# Patient Record
Sex: MALE | Race: BLACK OR AFRICAN AMERICAN | NOT HISPANIC OR LATINO | Employment: UNEMPLOYED | URBAN - METROPOLITAN AREA
[De-identification: names, ages, dates, MRNs, and addresses within clinical notes are randomized per-mention and may not be internally consistent; named-entity substitution may affect disease eponyms.]

---

## 2023-03-08 ENCOUNTER — TELEPHONE (OUTPATIENT)
Dept: BEHAVIORAL HEALTH | Facility: CLINIC | Age: 23
End: 2023-03-08

## 2023-03-08 ENCOUNTER — HOSPITAL ENCOUNTER (EMERGENCY)
Facility: CLINIC | Age: 23
Discharge: HOME | End: 2023-03-09
Attending: PSYCHIATRY & NEUROLOGY | Admitting: PSYCHIATRY & NEUROLOGY

## 2023-03-08 DIAGNOSIS — Z59.00 HOMELESS: ICD-10-CM

## 2023-03-08 DIAGNOSIS — F19.151: ICD-10-CM

## 2023-03-08 DIAGNOSIS — F19.10 SUBSTANCE ABUSE (H): ICD-10-CM

## 2023-03-08 DIAGNOSIS — Z20.822 LAB TEST NEGATIVE FOR COVID-19 VIRUS: ICD-10-CM

## 2023-03-08 DIAGNOSIS — F19.951 SUBSTANCE-INDUCED PSYCHOTIC DISORDER WITH HALLUCINATIONS (H): ICD-10-CM

## 2023-03-08 DIAGNOSIS — F20.9 SCHIZOPHRENIA, UNSPECIFIED TYPE (H): ICD-10-CM

## 2023-03-08 LAB
ALBUMIN SERPL BCG-MCNC: 3.8 G/DL (ref 3.5–5.2)
ALP SERPL-CCNC: 68 U/L (ref 40–129)
ALT SERPL W P-5'-P-CCNC: 14 U/L (ref 10–50)
AMPHETAMINES UR QL SCN: ABNORMAL
ANION GAP SERPL CALCULATED.3IONS-SCNC: 10 MMOL/L (ref 7–15)
AST SERPL W P-5'-P-CCNC: 14 U/L (ref 10–50)
BARBITURATES UR QL SCN: ABNORMAL
BASOPHILS # BLD AUTO: 0 10E3/UL (ref 0–0.2)
BASOPHILS NFR BLD AUTO: 0 %
BENZODIAZ UR QL SCN: ABNORMAL
BILIRUB SERPL-MCNC: 0.2 MG/DL
BUN SERPL-MCNC: 9.4 MG/DL (ref 6–20)
BZE UR QL SCN: ABNORMAL
CALCIUM SERPL-MCNC: 9.3 MG/DL (ref 8.6–10)
CANNABINOIDS UR QL SCN: ABNORMAL
CHLORIDE SERPL-SCNC: 103 MMOL/L (ref 98–107)
CREAT SERPL-MCNC: 1.15 MG/DL (ref 0.67–1.17)
DEPRECATED HCO3 PLAS-SCNC: 25 MMOL/L (ref 22–29)
EOSINOPHIL # BLD AUTO: 0.2 10E3/UL (ref 0–0.7)
EOSINOPHIL NFR BLD AUTO: 2 %
ERYTHROCYTE [DISTWIDTH] IN BLOOD BY AUTOMATED COUNT: 16.8 % (ref 10–15)
GFR SERPL CREATININE-BSD FRML MDRD: >90 ML/MIN/1.73M2
GLUCOSE SERPL-MCNC: 89 MG/DL (ref 70–99)
HCT VFR BLD AUTO: 38.2 % (ref 40–53)
HGB BLD-MCNC: 13 G/DL (ref 13.3–17.7)
IMM GRANULOCYTES # BLD: 0 10E3/UL
IMM GRANULOCYTES NFR BLD: 0 %
LYMPHOCYTES # BLD AUTO: 2.3 10E3/UL (ref 0.8–5.3)
LYMPHOCYTES NFR BLD AUTO: 32 %
MCH RBC QN AUTO: 28.3 PG (ref 26.5–33)
MCHC RBC AUTO-ENTMCNC: 34 G/DL (ref 31.5–36.5)
MCV RBC AUTO: 83 FL (ref 78–100)
MONOCYTES # BLD AUTO: 0.8 10E3/UL (ref 0–1.3)
MONOCYTES NFR BLD AUTO: 12 %
NEUTROPHILS # BLD AUTO: 3.8 10E3/UL (ref 1.6–8.3)
NEUTROPHILS NFR BLD AUTO: 54 %
NRBC # BLD AUTO: 0 10E3/UL
NRBC BLD AUTO-RTO: 0 /100
OPIATES UR QL SCN: ABNORMAL
PLATELET # BLD AUTO: 153 10E3/UL (ref 150–450)
POTASSIUM SERPL-SCNC: 4.3 MMOL/L (ref 3.4–5.3)
PROT SERPL-MCNC: 6.9 G/DL (ref 6.4–8.3)
RBC # BLD AUTO: 4.6 10E6/UL (ref 4.4–5.9)
SARS-COV-2 RNA RESP QL NAA+PROBE: NEGATIVE
SODIUM SERPL-SCNC: 138 MMOL/L (ref 136–145)
WBC # BLD AUTO: 7.1 10E3/UL (ref 4–11)

## 2023-03-08 PROCEDURE — 80053 COMPREHEN METABOLIC PANEL: CPT | Performed by: PSYCHIATRY & NEUROLOGY

## 2023-03-08 PROCEDURE — C9803 HOPD COVID-19 SPEC COLLECT: HCPCS | Performed by: PSYCHIATRY & NEUROLOGY

## 2023-03-08 PROCEDURE — 80307 DRUG TEST PRSMV CHEM ANLYZR: CPT | Performed by: FAMILY MEDICINE

## 2023-03-08 PROCEDURE — 36415 COLL VENOUS BLD VENIPUNCTURE: CPT | Performed by: PSYCHIATRY & NEUROLOGY

## 2023-03-08 PROCEDURE — U0005 INFEC AGEN DETEC AMPLI PROBE: HCPCS | Performed by: PSYCHIATRY & NEUROLOGY

## 2023-03-08 PROCEDURE — 99285 EMERGENCY DEPT VISIT HI MDM: CPT | Mod: 25 | Performed by: PSYCHIATRY & NEUROLOGY

## 2023-03-08 PROCEDURE — 99285 EMERGENCY DEPT VISIT HI MDM: CPT | Performed by: PSYCHIATRY & NEUROLOGY

## 2023-03-08 PROCEDURE — 90791 PSYCH DIAGNOSTIC EVALUATION: CPT

## 2023-03-08 PROCEDURE — 250N000013 HC RX MED GY IP 250 OP 250 PS 637: Performed by: PSYCHIATRY & NEUROLOGY

## 2023-03-08 PROCEDURE — 85025 COMPLETE CBC W/AUTO DIFF WBC: CPT | Performed by: PSYCHIATRY & NEUROLOGY

## 2023-03-08 RX ORDER — HALOPERIDOL DECANOATE 100 MG/ML
100 INJECTION INTRAMUSCULAR
COMMUNITY

## 2023-03-08 RX ORDER — ERGOCALCIFEROL 1.25 MG/1
50000 CAPSULE, LIQUID FILLED ORAL
COMMUNITY

## 2023-03-08 RX ORDER — ACYCLOVIR 400 MG/1
400 TABLET ORAL EVERY 12 HOURS
COMMUNITY

## 2023-03-08 RX ORDER — OLANZAPINE 10 MG/1
10 TABLET, ORALLY DISINTEGRATING ORAL ONCE
Status: COMPLETED | OUTPATIENT
Start: 2023-03-08 | End: 2023-03-08

## 2023-03-08 RX ORDER — BUPROPION HYDROCHLORIDE 150 MG/1
150 TABLET ORAL DAILY
Status: DISCONTINUED | OUTPATIENT
Start: 2023-03-09 | End: 2023-03-09 | Stop reason: HOSPADM

## 2023-03-08 RX ORDER — DIVALPROEX SODIUM 500 MG/1
TABLET, DELAYED RELEASE ORAL
COMMUNITY

## 2023-03-08 RX ORDER — DIVALPROEX SODIUM 500 MG/1
500 TABLET, DELAYED RELEASE ORAL EVERY MORNING
Status: DISCONTINUED | OUTPATIENT
Start: 2023-03-09 | End: 2023-03-09 | Stop reason: HOSPADM

## 2023-03-08 RX ORDER — BENZTROPINE MESYLATE 1 MG/1
1 TABLET ORAL 2 TIMES DAILY
COMMUNITY

## 2023-03-08 RX ORDER — DIVALPROEX SODIUM 500 MG/1
1000 TABLET, DELAYED RELEASE ORAL AT BEDTIME
Status: DISCONTINUED | OUTPATIENT
Start: 2023-03-08 | End: 2023-03-08

## 2023-03-08 RX ORDER — HYDROXYZINE HYDROCHLORIDE 50 MG/1
50 TABLET, FILM COATED ORAL AT BEDTIME
COMMUNITY

## 2023-03-08 RX ORDER — BUPROPION HYDROCHLORIDE 150 MG/1
150 TABLET ORAL EVERY MORNING
COMMUNITY

## 2023-03-08 RX ORDER — DIVALPROEX SODIUM 500 MG/1
1000 TABLET, DELAYED RELEASE ORAL AT BEDTIME
Status: DISCONTINUED | OUTPATIENT
Start: 2023-03-08 | End: 2023-03-09 | Stop reason: HOSPADM

## 2023-03-08 RX ORDER — DIVALPROEX SODIUM 500 MG/1
500 TABLET, DELAYED RELEASE ORAL EVERY MORNING
Status: DISCONTINUED | OUTPATIENT
Start: 2023-03-09 | End: 2023-03-08

## 2023-03-08 RX ADMIN — OLANZAPINE 10 MG: 10 TABLET, ORALLY DISINTEGRATING ORAL at 15:52

## 2023-03-08 RX ADMIN — DIVALPROEX SODIUM 1000 MG: 500 TABLET, DELAYED RELEASE ORAL at 21:48

## 2023-03-08 RX ADMIN — METFORMIN HYDROCHLORIDE 500 MG: 500 TABLET, FILM COATED ORAL at 21:48

## 2023-03-08 SDOH — ECONOMIC STABILITY - HOUSING INSECURITY: HOMELESSNESS UNSPECIFIED: Z59.00

## 2023-03-08 ASSESSMENT — COLUMBIA-SUICIDE SEVERITY RATING SCALE - C-SSRS
TOTAL  NUMBER OF ABORTED OR SELF INTERRUPTED ATTEMPTS LIFETIME: YES
1. HAVE YOU WISHED YOU WERE DEAD OR WISHED YOU COULD GO TO SLEEP AND NOT WAKE UP?: YES
TOTAL  NUMBER OF ACTUAL ATTEMPTS PAST 3 MONTHS: 1
TOTAL  NUMBER OF ACTUAL ATTEMPTS LIFETIME: 1
TOTAL  NUMBER OF ABORTED OR SELF INTERRUPTED ATTEMPTS SINCE LAST CONTACT: 1
4. HAVE YOU HAD THESE THOUGHTS AND HAD SOME INTENTION OF ACTING ON THEM?: NO
REASONS FOR IDEATION LIFETIME: COMPLETELY TO END OR STOP THE PAIN (YOU COULDN'T GO ON LIVING WITH THE PAIN OR HOW YOU WERE FEELING)
5. HAVE YOU STARTED TO WORK OUT OR WORKED OUT THE DETAILS OF HOW TO KILL YOURSELF? DO YOU INTEND TO CARRY OUT THIS PLAN?: YES
4. HAVE YOU HAD THESE THOUGHTS AND HAD SOME INTENTION OF ACTING ON THEM?: NO
LETHALITY/MEDICAL DAMAGE CODE MOST RECENT ACTUAL ATTEMPT: MINOR PHYSICAL DAMAGE
6. HAVE YOU EVER DONE ANYTHING, STARTED TO DO ANYTHING, OR PREPARED TO DO ANYTHING TO END YOUR LIFE?: YES
TOTAL  NUMBER OF ABORTED OR SELF INTERRUPTED ATTEMPTS PAST 3 MONTHS: YES
ATTEMPT LIFETIME: YES
TOTAL  NUMBER OF INTERRUPTED ATTEMPTS LIFETIME: NO
1. IN THE PAST MONTH, HAVE YOU WISHED YOU WERE DEAD OR WISHED YOU COULD GO TO SLEEP AND NOT WAKE UP?: YES
3. HAVE YOU BEEN THINKING ABOUT HOW YOU MIGHT KILL YOURSELF?: YES
5. HAVE YOU STARTED TO WORK OUT OR WORKED OUT THE DETAILS OF HOW TO KILL YOURSELF? DO YOU INTEND TO CARRY OUT THIS PLAN?: YES
2. HAVE YOU ACTUALLY HAD ANY THOUGHTS OF KILLING YOURSELF?: YES
TOTAL  NUMBER OF PREPARATORY ACTS LIFETIME: 1
TOTAL  NUMBER OF PREPARATORY ACTS PAST 3 MONTHS: 1
ATTEMPT PAST THREE MONTHS: YES
TOTAL  NUMBER OF ABORTED OR SELF INTERRUPTED ATTEMPTS LIFETIME: 1
2. HAVE YOU ACTUALLY HAD ANY THOUGHTS OF KILLING YOURSELF?: YES
6. HAVE YOU EVER DONE ANYTHING, STARTED TO DO ANYTHING, OR PREPARED TO DO ANYTHING TO END YOUR LIFE?: YES
LETHALITY/MEDICAL DAMAGE CODE MOST RECENT POTENTIAL ATTEMPT: BEHAVIOR LIKELY TO RESULT IN INJURY BUT NOT LIKELY TO CAUSE DEATH
REASONS FOR IDEATION PAST MONTH: COMPLETELY TO END OR STOP THE PAIN (YOU COULDN'T GO ON LIVING WITH THE PAIN OR HOW YOU WERE FEELING)

## 2023-03-08 ASSESSMENT — ACTIVITIES OF DAILY LIVING (ADL)
ADLS_ACUITY_SCORE: 35

## 2023-03-08 NOTE — ED NOTES
Pt inform this writer that he is ready to go back to [facility] ([facility] ) - [phone]

## 2023-03-08 NOTE — ED PROVIDER NOTES
"    [facility] EMERGENCY DEPARTMENT ([facility])     March 8, 2023     History     Chief Complaint   Patient presents with     Paranoid     Patient arrived via EMS. History of Schizophrenia; not taking medications. Patient left group; was brought to a new group home and said he could not live there due to bugs. Patient reports drinking bottle of cough syrup yesterday and smoked marijuana. . Patient reports auditory hallucinations.     Suicidal     Suicidal thoughts with plan to hang himself with belt. Patient reports attempting to hang himself 1 week ago.     HPI  Steven Valentino Lenwood is a 22 year old male with a past medical history significant for substance abuse who presents to the Emergency Department for evaluation of SI and paranoia.  Patient was seen by DEC .  Per , patient states he has been using THC and feeling depressed.  Denies any other substance use.  He vaguely admits to abusing cough syrup.  States he was at a new group home for approximately 10 minutes today but left right away because there were rats, flies, spiders, and rat droppings there.  Prior to this group home he was at a different shelter group home in [place].  He states that he was on the streets at one point, then was living in an apartment building.    Per mental health , patient states that he called the police himself today.  He presents with paranoia and SI, but the patient states that he is unsure if it is a true psychosis or if he is \"just high\".  He identifies SI with a plan to hang himself with a belt in his room.  He tells the  that he did attempt this 2-3 weeks ago in his closet but did not work.  He continues to endorse suicidal intent, states he \"cannot deal with it\", states if he had a gun he would shoot himself in the head.  States he would prefer not to hang himself as he is afraid he will choke himself and not be successful and would then also have other complications.  " "With regard to SIB he states he has done in an eraser burn in the past but nothing current.  In addition, patient does report seeing cars and hearing voices.  He states that the cars will drive by and something will be said and he is responding to this.  States that the voices he hears tell him to hurt himself, shoot someone, that he will be brutalized, or that he will go to intermediate.  Denies HI.     notes that the patient appears to change his answers,, such as first stating that he has been suicidal for a year and then stating he has been suicidal for a couple of weeks.   states that he has schizophrenia and is not taking his medications, but the patient cannot tell the  the name of his group home or what his parents' contact info is.  He states that he is his own guardian.      Past Medical History  History reviewed. No pertinent past medical history.  History reviewed. No pertinent surgical history.  No current outpatient medications on file.    No Known Allergies  Family History  History reviewed. No pertinent family history.  Social History   Social History     Tobacco Use     Smoking status: Every Day     Packs/day: 0.25     Types: Cigarettes     Smokeless tobacco: Never   Substance Use Topics     Alcohol use: Not Currently     Drug use: Yes     Types: Marijuana      Past medical history, past surgical history, medications, allergies, family history, and social history were reviewed with the patient. No additional pertinent items.      A medically appropriate review of systems was performed with pertinent positives and negatives noted in the HPI, and all other systems negative.    Physical Exam   BP: 133/76  Pulse: 76  Temp: 97.7  F (36.5  C)  Resp: 18  Height: 185.4 cm (6' 1\")  Weight: 112.5 kg (248 lb)  SpO2: 97 %  Physical Exam  Vitals and nursing note reviewed.   HENT:      Head: Normocephalic.   Eyes:      Pupils: Pupils are equal, round, and reactive to light.   Pulmonary:      " Effort: Pulmonary effort is normal.   Musculoskeletal:         General: Normal range of motion.      Cervical back: Normal range of motion.   Neurological:      General: No focal deficit present.      Mental Status: He is alert.   Psychiatric:         Attention and Perception: Perception normal. He is inattentive. He does not perceive auditory or visual hallucinations.         Mood and Affect: Mood normal. Affect is inappropriate.         Speech: Speech is tangential.         Behavior: Behavior normal. Behavior is not agitated, aggressive, hyperactive or combative. Behavior is cooperative.         Thought Content: Thought content is paranoid. Thought content includes suicidal ideation. Thought content does not include homicidal ideation.         Cognition and Memory: Cognition normal.         Judgment: Judgment normal.           ED Course, Procedures, & Data      Procedures       ED Course Selections: A consult was attained from the  DEC service. The case was discussed with Jenni Howard from that service. The consulting service's recommendations were provided at 3:00 PM.  10 minutes spent discussing case, care and disposition.    5 minutes spent reviewing prior records including EMS/police reports.     Mental Health Risk Assessment      PSS-3    Date and Time Over the past 2 weeks have you felt down, depressed, or hopeless? Over the past 2 weeks have you had thoughts of killing yourself? Have you ever attempted to kill yourself? When did this last happen? User   03/08/23 1326 yes yes yes within the last month (but not today) TMW      C-SSRS ([place])    Date and Time Q1 Wished to be Dead (Past Month) Q2 Suicidal Thoughts (Past Month) Q3 Suicidal Thought Method Q4 Suicidal Intent without Specific Plan Q5 Suicide Intent with Specific Plan Q6 Suicide Behavior (Lifetime) Within the Past 3 Months? RETIRED: Level of Risk per Screen Screening Not Complete User   03/08/23 1326 yes yes yes no yes yes -- -- -- TMW               Suicide assessment completed by mental health (D.E.C., LCSW, etc.)       Results for orders placed or performed during the hospital encounter of 03/08/23   Drug abuse screen 1 urine (ED)     Status: Abnormal   Result Value Ref Range    Amphetamines Urine Screen Positive (A) Screen Negative    Barbituates Urine Screen Negative Screen Negative    Benzodiazepine Urine Screen Negative Screen Negative    Cannabinoids Urine Screen Negative Screen Negative    Cocaine Urine Screen Negative Screen Negative    Opiates Urine Screen Negative Screen Negative   Asymptomatic COVID-19 Virus (Coronavirus) by PCR Nasopharyngeal     Status: Normal    Specimen: Nasopharyngeal; Swab   Result Value Ref Range    SARS CoV2 PCR Negative Negative    Narrative    Testing was performed using the Xpert Xpress SARS-CoV-2 Assay on the Cepheid Gene-Xpert Instrument Systems. Additional information about this Emergency Use Authorization (EUA) assay can be found via the Lab Guide. This test should be ordered for the detection of SARS-CoV-2 in individuals who meet SARS-CoV-2 clinical and/or epidemiological criteria as well as from individuals without symptoms or other reasons to suspect COVID-19. Test performance for asymptomatic patients has only been established in anterior nasal swab specimens. This test is for in vitro diagnostic use under the FDA EUA for laboratories certified under CLIA to perform high complexity testing. This test has not been FDA cleared or approved. A negative result does not rule out the presence of PCR inhibitors in the specimen or target RNA concentration below the limit of detection for the assay. The possibility of a false negative should be considered if the patient's recent exposure or clinical presentation suggests COVID-19. This test was validated by the [facility]. This laboratory is certified under the Clinical Laboratory Improvement Amendments (CLIA) as qualified to  perform high complexity laboratory testing.     Urine Drugs of Abuse Screen     Status: Abnormal    Narrative    The following orders were created for panel order Urine Drugs of Abuse Screen.  Procedure                               Abnormality         Status                     ---------                               -----------         ------                     Drug abuse screen 1 urin...[[id number]]  Abnormal            Final result                 Please view results for these tests on the individual orders.     Medications   OLANZapine zydis (zyPREXA) ODT tab 10 mg (has no administration in time range)     Labs Ordered and Resulted from Time of ED Arrival to Time of ED Departure   DRUG ABUSE SCREEN 1 URINE (ED) - Abnormal       Result Value    Amphetamines Urine Screen Positive (*)     Barbituates Urine Screen Negative      Benzodiazepine Urine Screen Negative      Cannabinoids Urine Screen Negative      Cocaine Urine Screen Negative      Opiates Urine Screen Negative     COVID-19 VIRUS (CORONAVIRUS) BY PCR - Normal    SARS CoV2 PCR Negative       No orders to display          Critical care was not performed.     Medical Decision Making  The patient's presentation was of moderate complexity (an undiagnosed new problem with uncertain diagnosis).    The patient's evaluation involved:  an assessment requiring an independent historian (see separate area of note for details)  review of external note(s) from 2 sources (see separate area of note for details)    The patient's management necessitated moderate risk (limitations due to social determinants of health (see separate area of note for details)), high risk (drug therapy requiring intensive monitoring (see separate area of note for details)) and high risk (a decision regarding hospitalization).      Assessment & Plan    Patient is here brought in by EMS, citing that he could not stay at a new facility which appears to be consistent with a sober house than a group  "home. [place] that he was sent to a sober house in the [facility] which he found unsatisfactory, citing presence of bugs, pests and rat droppings. This place was in [facility]. Patient was vague about the place and where he has been prior to being sent there. He is interested in going back to treatment, then going to another sober house. He feels he needs to be admitted however as he threatens suicide. He currently appears impaired and under the influence. His drug screen in positive for amphetamines (and negative for THC despite abusing it.)    Patient is likely under the influence of a stimulant with would be consistent with his paranoia and hallucinations. He is threatening suicide and will be referred for admission, but likely will be observed for the duration in the ED as there is inpatient bed capacity. He is given a dose of Zyprexa to manage his anxiety and paranoia while he is staying in the ED.    I have reviewed the nursing notes. I have reviewed the findings, diagnosis, plan and need for follow up with the patient.    New Prescriptions    No medications on file       Final diagnoses:   Substance abuse (H)   Substance-induced psychotic disorder with hallucinations (H)   Homeless     I, Yuly Martínez, am serving as a trained medical scribe to document services personally performed by Doug Salvador MD, based on the provider's statements to me.   Doug MORALES MD, was physically present and have reviewed and verified the accuracy of this note documented by Yuly Martínez.    Doug Salvador MD  [facility] EMERGENCY DEPARTMENT  3/8/2023     Doug Salvador MD  03/08/23 1553    "

## 2023-03-08 NOTE — ED TRIAGE NOTES
Patient arrived via EMS. History of Schizophrenia; not taking medications. Patient left group; was brought to a new group home and said he could not live there due to bugs. Patient reports drinking bottle of cough syrup yesterday and smoked marijuana. . Patient reports auditory hallucinations.     Triage Assessment     Row Name 03/08/23 1329       Triage Assessment (Adult)    Airway WDL WDL       Respiratory WDL    Respiratory WDL WDL       Skin Circulation/Temperature WDL    Skin Circulation/Temperature WDL WDL       Cardiac WDL    Cardiac WDL WDL       Peripheral/Neurovascular WDL    Peripheral Neurovascular WDL WDL       Cognitive/Neuro/Behavioral WDL    Cognitive/Neuro/Behavioral WDL X    Level of Consciousness alert    Arousal Level opens eyes spontaneously    Orientation oriented x 4    Speech clear    Mood/Behavior cooperative       Robbie Coma Scale    Best Eye Response 4-->(E4) spontaneous    Best Motor Response 6-->(M6) obeys commands    Best Verbal Response 5-->(V5) oriented    Robbie Coma Scale Score 15              
negative...

## 2023-03-08 NOTE — TELEPHONE ENCOUNTER
S: [facility] VALENCIA Angela  Jenni calling at 2:20 pm  about a 22 year old/Male presenting with S.I..        B: Pt arrived via EMS. Presenting problem, stressors: Pt reports he is very depressed and suicidal with a plan to hang himself.  He states he attempted to do so about 3 weeks ago.  He states he would shoot himself if he had a gun.  Pt's affect is incongruent with his statements and appears to be a poor historian.    Pt affect in ED: incongruent.  Pt Dx: depressive d/o and sub. Use.  Previous [facility] hx? No, pt denies    Pt endorses SI with a plan to hang self   Hx of suicide attempt? Yes:   Pt remote hx of sib.  Pt denies HI , but states voices tell him he should shoot someone.  Pt endorses auditory hallucinations  and visual hallucinations..     Hx of aggression/violence, sexual offences, legal concerns, or Epic care plan?   No  Current concerns for aggression this visit?  Not at this time  Does pt have a history of Civil Commitment? No, pt denies  Is Pt their own guardian? Yes    Pt is not prescribed medication. Is patient medication compliant? N/A  Pt denies OP services   CD concerns: Actively using/consuming Pt reports pot but is positive for amphetamines.  Acute or chronic medical concerns: None known  Does Pt present with specific needs, assistive devices, or exclusionary criteria? None      Pt is ambulatory  Pt is able to perform ADLs independently      A: Pt to be reviewed for [facility] admission. Pt is Voluntary  Preferred placement: [facility]    COVID:Negative  Utox: Positive for amphetamines   CMP: not ordered  CBC: not ordered    HCG: N/A    R: Patient cleared and ready for behavioral bed placement: Yes  Pt placed on [facility] worklist? Yes

## 2023-03-08 NOTE — PLAN OF CARE
Steven Valentino Lenwood  March 8, 2023  Plan of Care Hand-off Note     Patient Care Path: Inpatient Mental Health    Plan for Care:     Patient is currently high risk.  He is reporting suicidal ideation with plan to hang himself.  He states if he had a gun he would shoot himself.  Patient reports an attempt by hanging and gives different accounts of when it occurred.  Responses included 1,2,3 weeks ago.  Patient appears under the influence of substances.  Patient reports seeing cars and hearing voices.  He states he thinks something and the voices respond back.  He reports they tell him to hurt himself, shoot someone, or threaten that he will be brutalized or go to alf.   Plan for inpatient admission.  Continue to monitor patient needs and potential disposition change as patient clears.  There may also be concern for secondary gain, related to housing.      Critical Safety Issues: suicidal ideation with plan and intent.  Substance abuse/intoxication    Overview:  This patient is a child/adolescent: No    This patient has additional special visitor precautions: No    Legal Status: Voluntary    Contacts:   None identified    Psychiatry Consult:  Psychiatry Consult not requested because of plan for inpatient.    Updated Attending Provider, Dr. Salvador, regarding plan of care.    Jenni Howard, [facility]

## 2023-03-08 NOTE — CONSULTS
"Diagnostic Evaluation Consultation  Crisis Assessment    Patient was assessed: In Person  Patient location: [facility]  Was a release of information signed: No. Reason: specific information available regarding group home not available.      Referral Data and Chief Complaint  Steven is a 22 year old, who uses he/him pronouns, and presents to the ED via EMS. Patient is referred to the ED by self. Patient is presenting to the ED for the following concerns: paranoia, suicidal ideation.      Informed Consent and Assessment Methods     Patient identifies being his own guardian.  Writer met with patient and explained the crisis assessment process, including applicable information disclosures and limits to confidentiality, assessed understanding of the process, and obtained consent to proceed with the assessment. Patient was observed to be able to participate in the assessment as evidenced by verbal agreement and engagement in session.. Assessment methods included conducting a formal interview with patient, review of medical records, collaboration with medical staff, and obtaining relevant collateral information from family and community providers when available..     Over the course of this crisis assessment provided reassurance, offered validation and engaged patient in problem solving and disposition planning. Patient's response to interventions was receptive.     Summary of Patient Situation     Patient reports he is here for marijuana and feeling really depressed.  He states he called the police himself.  He states he was at a new group home for about 10 minutes.  \"I have bad friends.\"  Patient states he left and called the police.  He reports there were rats, flies, spiders, and rat droppings at the new place.  He states he was at another group home prior to this new one.  He then explains it is a shelter, like an apartment.  Patient states he didn't want to stay at the first one due to people there doing drugs.  " Patient is unable to provide a name or contact number for whatever type of housing that has been in.  Patient states he has been doing drugs and feeling depressed.  He reports doing a marijuana edible today.  He denies other substance use.  Patient gives inconsistent reports regarding substance use.  He appears under the influence of substances.  He reports using marijauna the past two months and never prior and later reports he was kicked out of his parents home as a teenage for using marijuana.  He reports his last use of marijuana was earlier today.  He is negative for cannabis.  He is positive for amphetamine.   He is reporting suicidal ideation with plan to hang himself.  He states if he had a gun he would shoot himself.  Patient reports an attempt by hanging and gives different accounts of when it occurred.  Responses included 1,2,3 weeks ago.   Patient reports seeing cars and hearing voices.  He states he thinks something and the voices respond back.  He reports they tell him to hurt himself, shoot someone, or threaten that he will be brutalized or go to half-way      Brief Psychosocial History     Patient reports he was born and raised in [place].  He reports his parents are .  He has an older brother and younger sister.  He shares that his brother works 3 jobs and his sister is in college.  He states he was kicked out of his parents home at age 17-18 for using drugs.  Patient does not have contact information for his parents.  His report of his siblings appears to indicate more recent communication.  He does not have any contact information for any group home or place he has been living.       Significant Clinical History     Patient is not a reliable historian at this time.  Patient denies any formal mental health history.  He reports feeling really depressed.  Triage notes from nursing indicate report from EMS that patient has hx of schizophrenia and is not taking medications.  He was reportedly  brought to a new group home today and said he could not live there due to bugs.  He denies hx of inpatient mental health or chemical dependency treatment.       Collateral Information    Patient does not have contact information for his parents or housing situation.       Risk Assessment  [place] Suicide Severity Rating Scale Full Clinical Version: 3/8/23  Suicidal Ideation  1. Wish to be Dead (Lifetime): Yes  1. Wish to be Dead (Past 1 Month): Yes  2. Non-Specific Active Suicidal Thoughts (Lifetime): Yes  2. Non-Specific Active Suicidal Thoughts (Past 1 Month): Yes  3. Active Suicidal Ideation with any Methods (Not Plan) Without Intent to Act (Lifetime): Yes  3. Active Suicidal Ideation with any Methods (Not Plan) Without Intent to Act (Past 1 Month): Yes  4. Active Suicidal Ideation with Some Intent to Act, Without Specific Plan (Lifetime): No  4. Active Suicidal Ideation with Some Intent to Act, Without Specific Plan (Past 1 Month): No  5. Active Suicidal Ideation with Specific Plan and Intent (Lifetime): Yes  5. Active Suicidal Ideation with Specific Plan and Intent (Past 1 Month): Yes  Intensity of Ideation  Most Severe Ideation Rating (Lifetime): 5  Most Severe Ideation Rating (Past 1 Month): 5  Frequency (Lifetime): Many times each day  Frequency (Past 1 Month): Many times each day  Duration (Lifetime): More than 8 hours/persistent or continuous  Duration (Past 1 Month): More than 8 hours/persistent or continuous  Controllability (Lifetime): Unable to control thoughts  Controllability (Past 1 Month): Unable to control thoughts  Deterrents (Lifetime): Deterrents definitely did not stop you  Deterrents (Past 1 Month): Deterrents definitely did not stop you  Reasons for Ideation (Lifetime): Completely to end or stop the pain (You couldn't go on living with the pain or how you were feeling)  Reasons for Ideation (Past 1 Month): Completely to end or stop the pain (You couldn't go on living with the pain or how  you were feeling)  Suicidal Behavior  Actual Attempt (Lifetime): Yes  Total Number of Actual Attempts (Lifetime): 1  Actual Attempt (Past 3 Months): Yes  Total Number of Actual Attempts (Past 3 Months): 1  Has subject engaged in non-suicidal self-injurious behavior? (Lifetime): Yes  Has subject engaged in non-suicidal self-injurious behavior? (Past 3 Months): No  Interrupted Attempts (Lifetime): No  Aborted or Self-Interrupted Attempt (Lifetime): Yes  Total Number of Aborted or Self-Interrupted Attempts (Lifetime): 1  Aborted or Self-Interrupted Attempt (Past 3 Months): Yes  Total Number of Aborted or Self-Interrupted Attempts (Past 3 Months): 1  Preparatory Acts or Behavior (Lifetime): Yes  Total Number of Preparatory Acts (Lifetime): 1  Preparatory Acts or Behavior (Past 3 Months): Yes  Total Number of Preparatory Acts (Past 3 Months): 1  C-SSRS Risk (Lifetime/Recent)  Calculated C-SSRS Risk Score (Lifetime/Recent): High Risk    [place] Suicide Severity Rating Scale Since Last Contact: n/a    Validity of evaluation is impacted by presenting factors during interview. Patient is under the influence of substances.  There may be concerns for secondary gain, related to housing.   Comments regarding subjective versus objective responses to Gila tool: none identified.  Environmental or Psychosocial Events: challenging interpersonal relationships, impulsivity/recklessness, unemployment/underemployment, unstable housing, homelessness and neither working nor attending school  Chronic Risk Factors: history of suicide attempts (1 attempt by hanging in the last 1-3 weeks.) and history of Non-Suicidal Self Injury (NSSI)   Warning Signs: seeking access to means to hurt or kill self, talking or writing about death, dying, or suicide, acting reckless or engaging in risky activities and increasing substance use or abuse  Protective Factors: help seeking  Interpretation of Risk Scoring, Risk Mitigation Interventions and Safety  Plan:  Patient is currently high risk.  He is reporting suicidal ideation with plan to hang himself.  He states if he had a gun he would shoot himself.  Patient reports an attempt by hanging and gives different accounts of when it occurred.  Responses included 1,2,3 weeks ago.  Patient appears under the influence of substances.  Plan for inpatient admission.  Continue to monitor patient needs and potential disposition change as patient clears.  There may also be concern for secondary gain, related to housing.         Does the patient have thoughts of harming others?  Patient denies thoughts of harming others.  He does report voices telling him to shoot others.     Is the patient engaging in sexually inappropriate behavior?  no        Current Substance Abuse     Is there recent substance abuse? Patient given inconsistent reports regarding substance use.  He appears under the influence of substances.  He reports using marijauna the past two months and never prior and later reports he was kicked out of his parents home as a teenage for using marijuana.  He reports his last use of marijuana was earlier today.  He is negative for cannabis.  He is positive for amphetamine.       Was a urine drug screen or blood alcohol level obtained: Yes positive for amphetamine       Mental Status Exam     Affect: incongruent  Appearance: Appropriate    Attention Span/Concentration: Inattentive  Eye Contact: Variable   Fund of Knowledge: Appropriate    Language /Speech Content: Fluent   Language /Speech Volume: Normal    Language /Speech Rate/Productions: Normal    Recent Memory: Poor   Remote Memory: Poor   Mood: Normal and Other: laughing at times.    Orientation to Person: Yes    Orientation to Place: Yes   Orientation to Time of Day: Yes    Orientation to Date: Yes    Situation (Do they understand why they are here?): Yes    Psychomotor Behavior: Normal    Thought Content: Suicidal   Thought Form: Intact      History of commitment:  No     Medication    Psychotropic medications: No  Medication changes made in the last two weeks: No       Current Care Team    Primary Care Provider: No  Psychiatrist: No  Therapist: No  : No     CTSS or ARMHS: No  ACT Team: No  Other: No      Diagnosis    298.9 (F29)  Unspecified Schizophrenia Spectrum  Substance-Related & Addictive Disorders 292.89 Stimulant Intoxication,  292.89 Amphetamine or other stimulant With perceptual disturbances rule out    Clinical Summary and Substantiation of Recommendations    Patient is currently high risk.  He is reporting suicidal ideation with plan to hang himself.  He states if he had a gun he would shoot himself.  Patient reports an attempt by hanging and gives different accounts of when it occurred.  Responses included 1,2,3 weeks ago.  Patient appears under the influence of substances.   Patient reports seeing cars and hearing voices.  He states he thinks something and the voices respond back.  He reports they tell him to hurt himself, shoot someone, or threaten that he will be brutalized or go to snf .Plan for inpatient admission.  Continue to monitor patient needs and potential disposition change as patient clears.  There may also be concern for secondary gain, related to housing.      Admission to Inpatient Level of Care is indicated due to:    1. Patient risk of severity of behavioral health disorder is appropriate to proposed level of care as indicated by:    Imminent Risk of Harm: Very Recent suicide attempt or deliberate act of serious harm to self WITHOUT relief of factors precipitating the attempt or act, Command auditory hallucinations or paranoid delusions contributing to risk of suicide or self harm and Current plan for suicide or serious harm to self is present  And/or:  Behavioral health disorder is present and appropriate for inpatient care with both of the following:     Severe psychiatric, behavioral or other comorbid conditions are appropriate  for management at inpatient mental health as indicated by at least one of the following:   o Hallucinations; delusions; positive symptoms and Depressive symptoms, Comorbid substance use disorder and Impaired impulse control, judgement, or insight    Severe dysfunction in daily living is present as indicated by at least one of the following:   o Complete inability to maintain any appropriate aspect of personal responsibility in any adult roles    2. Inpatient mental health services are necessary to meet patient needs and at least one of the following:  Specific condition related to admission diagnosis is present and judged likely to further improve at proposed level of care and Specific condition related to admission diagnosis is present and judged likely to deteriorate in absence of treatment at proposed level of care    3. Situation and expectations are appropriate for inpatient care, as indicated by one of the following:   Voluntary treatment at lower level of care is not feasible    Disposition    Recommended disposition: Inpatient Mental Health       Reviewed case and recommendations with attending provider. Attending Name: Dr Salvador       Attending concurs with disposition: Yes       Patient and/or validated legal guardian concurs with disposition: Yes       Final disposition: Inpatient mental health .     Inpatient Details (if applicable):   Is patient admitted voluntarily:Yes      Patient aware of potential for transfer if there is not appropriate placement? No       Patient is willing to travel outside of the [facility] for placement? NA.  Revisit when patient clears    Behavioral Intake Notified? 3/8/23    Assessment Details    Patient interview started at: 2:28 and completed at: 3:00.     Total duration spent on the patient case in minutes: 1.25 hrs      CPT code(s) utilized: 05621 - Psychotherapy for Crisis - 60 (30-74*) min       Jenni Howard, URBAN, LICSW, Psychotherapist  [facility] - Triage & Transition  Services  Callback: [phone]

## 2023-03-09 ENCOUNTER — TELEPHONE (OUTPATIENT)
Dept: BEHAVIORAL HEALTH | Facility: CLINIC | Age: 23
End: 2023-03-09

## 2023-03-09 VITALS
TEMPERATURE: 97.8 F | BODY MASS INDEX: 32.87 KG/M2 | OXYGEN SATURATION: 98 % | DIASTOLIC BLOOD PRESSURE: 70 MMHG | RESPIRATION RATE: 18 BRPM | HEART RATE: 86 BPM | SYSTOLIC BLOOD PRESSURE: 118 MMHG | HEIGHT: 73 IN | WEIGHT: 248 LBS

## 2023-03-09 PROCEDURE — 250N000013 HC RX MED GY IP 250 OP 250 PS 637: Performed by: PSYCHIATRY & NEUROLOGY

## 2023-03-09 RX ADMIN — DIVALPROEX SODIUM 500 MG: 500 TABLET, DELAYED RELEASE ORAL at 08:08

## 2023-03-09 RX ADMIN — METFORMIN HYDROCHLORIDE 500 MG: 500 TABLET, FILM COATED ORAL at 08:08

## 2023-03-09 RX ADMIN — BUPROPION HYDROCHLORIDE 150 MG: 150 TABLET, EXTENDED RELEASE ORAL at 08:08

## 2023-03-09 ASSESSMENT — COLUMBIA-SUICIDE SEVERITY RATING SCALE - C-SSRS
ATTEMPT SINCE LAST CONTACT: NO
SUICIDE, SINCE LAST CONTACT: NO
2. HAVE YOU ACTUALLY HAD ANY THOUGHTS OF KILLING YOURSELF?: NO
TOTAL  NUMBER OF ABORTED OR SELF INTERRUPTED ATTEMPTS SINCE LAST CONTACT: NO
1. SINCE LAST CONTACT, HAVE YOU WISHED YOU WERE DEAD OR WISHED YOU COULD GO TO SLEEP AND NOT WAKE UP?: NO
TOTAL  NUMBER OF INTERRUPTED ATTEMPTS SINCE LAST CONTACT: NO
6. HAVE YOU EVER DONE ANYTHING, STARTED TO DO ANYTHING, OR PREPARED TO DO ANYTHING TO END YOUR LIFE?: NO

## 2023-03-09 ASSESSMENT — ACTIVITIES OF DAILY LIVING (ADL)
ADLS_ACUITY_SCORE: 35

## 2023-03-09 NOTE — TELEPHONE ENCOUNTER
No appropriate beds are currently available within the  system. Bed search update (metro) @ 12:15AM       [facility]: @ cap per website  [facility] system: Posting 1 bed at [address]. Per Noelle @ 00:22, they are full and won t have an update until 10AM  [facility]: @ cap per website  [facility]: @ cap per website  [facility]: @ cap per website    Pt remains on work list until appropriate placement is available

## 2023-03-09 NOTE — ED NOTES
"[facility] Crisis Reassessment      Steven Tripp was reassessed at the request of patient for the following reasons: disposition change. Pt was first seen on 3/8/2023 by Jenni Howard ; see the initial assessment note for details.      Patient Presentation    Initial ED presentation details:   Per initial assessment completed by Jenni Howard:  \"Patient reports he is here for marijuana and feeling really depressed.  He states he called the police himself.  He states he was at a new group home for about 10 minutes.  \"I have bad friends.\"  Patient states he left and called the police.  He reports there were rats, flies, spiders, and rat droppings at the new place.  He states he was at another group home prior to this new one.  He then explains it is a shelter, like an apartment.  Patient states he didn't want to stay at the first one due to people there doing drugs.  Patient is unable to provide a name or contact number for whatever type of housing that has been in.  Patient states he has been doing drugs and feeling depressed.  He reports doing a marijuana edible today.  He denies other substance use.  Patient gives inconsistent reports regarding substance use.  He appears under the influence of substances.  He reports using marijuana the past two months and never prior and later reports he was kicked out of his parents home as a teenage for using marijuana.  He reports his last use of marijuana was earlier today.  He is negative for cannabis.  He is positive for amphetamine.   He is reporting suicidal ideation with plan to hang himself.  He states if he had a gun he would shoot himself.  Patient reports an attempt by hanging and gives different accounts of when it occurred.  Responses included 1,2,3 weeks ago.   Patient reports seeing cars and hearing voices.  He states he thinks something and the voices respond back.  He reports they tell him to hurt himself, shoot someone, or threaten that he will be brutalized or go " "to shelter\"     Per recommendation from initial assessment:  \"Plan for inpatient admission.  Continue to monitor patient needs and potential disposition change as patient clears.  There may also be concern for secondary gain, related to housing.\"       Current patient presentation:   Patient was alert and orientated x4. He was calm and cooperative. He reports he is ready to go home and stated \"I lied because I didn't want to go back to the new group home.\". He shared that he wanted to move because he didn't like his house but the new house was worse so he called the police. Patient also reports that he took a few hits from a weed pen which made him \"scared\". Patient denies SI/HI/NSSIB. Patient reports no history of SI or attempts. He reports he only has ideation sometimes when he is high. He shared that he spoke with his  yesterday and was told he could go back to his first group home. Patient actively engaged in safety planning and identifies several support individuals. Patient shared the information for his group home and signed a release of information.     Changes observed since initial assessment:   Alert and orientated x4  Denies SI/HI/NSSIB  Denies symptoms of psychosis including hallucinations  Patient appears to be at baseline as he is engage, calm, willing to safety plan and denying SI/HI/NSSIB  Patient is willing and wanting to return to his group home.     Risk of Harm    [place] Suicide Severity Rating Scale Since Last Contact: 3/9/2023  Suicidal Ideation (Since Last Contact)  1. Wish to be Dead (Since Last Contact): No  2. Non-Specific Active Suicidal Thoughts (Since Last Contact): No  Suicidal Behavior (Since Last Contact)  Actual Attempt (Since Last Contact): No  Has subject engaged in non-suicidal self-injurious behavior? (Since Last Contact): No  Interrupted Attempts (Since Last Contact): No  Aborted or Self-Interrupted Attempt (Since Last Contact): No  Preparatory Acts or " Behavior (Since Last Contact): No  Suicide (Since Last Contact): No     C-SSRS Risk (Since Last Contact)  Calculated C-SSRS Risk Score (Since Last Contact): No Risk Indicated    Validity of evaluation is not impacted by presenting factors during interview.   Comments regarding subjective versus objective responses to [place] tool: n/a  Environmental or Psychosocial Events: recent life events: moved to a new group home  Chronic Risk Factors: serious, persistent mental illness   Warning Signs: none identified  Protective Factors: lives in a responsibly safe and stable environment, supportive ongoing medical and mental health care relationships, optimistic outlook - identification of future goals and constructive use of leisure time, enjoyable activities, resilience  Interpretation of Risk Scoring, Risk Mitigation Interventions and Safety Plan:  Patient denies SI/HI/NSSIB. Patient reports he lied yesterday because he did not want to return to his new group home. Patient has since learned that he can move back to his previous group home. Per  patient is medication compliant, he does not have access to medications or sharp objects and staff is available 24/7 at the group home.     Does the patient have thoughts of harming others? No    Mental Status Exam   Affect: Appropriate   Appearance: Appropriate    Attention Span/Concentration: Attentive?    Eye Contact: Engaged   Fund of Knowledge: Appropriate    Language /Speech Content: Fluent   Language /Speech Volume: Normal    Language /Speech Rate/Productions: Normal    Recent Memory: Intact   Remote Memory: Intact   Mood: Normal    Orientation to Person: Yes    Orientation to Place: Yes   Orientation to Time of Day: Yes    Orientation to Date: Yes    Situation (Do they understand why they are here?): Yes    Psychomotor Behavior: Normal    Thought Content: Clear   Thought Form: Goal Directed       Additional Collateral Information   Writer spoke with   Sarahy [phone].  She shared that patient requested to move to a new group home yesterday and within ten minutes of her leaving him after the move she was informed patient called 911. She reports she spoke with the patient later in the evening when he was at the hospital and he shared that he called 911 because he didn't like the new room. Patient has not made suicidal or homicidal statement to her. She reports patient is medication compliant and his invega injection was administered yesterday. Patient does not have access to sharp objects or medications and nor does patient have a history of seeking  these items out. She reports no concerns for patients safety and that he can discharge back to his original group home. She reports patient safely uses public transportation and cabs daily and request he be return to the group home via cab.       Therapeutic Intervention  The following therapeutic methodologies were employed when working with the patient: Establishing rapport, Active listening, Assess dimensions of crisis, Establish a discharge plan and Safety planning. Patient response to intervention: engaged.    Diagnosis:   298.9 (F29)  Unspecified Schizophrenia Spectrum  Substance-Related & Addictive Disorders 292.89 Stimulant Intoxication,  292.89 Amphetamine or other stimulant With perceptual disturbances rule out    Clinical Substantiation of Recommendations  After brief therapeutic intervention, observation, and aftercare planning by [facility] and in consultation with attending provider, the patient's initial crisis appears to have resolved and patients current mental state is appropriate for outpatient management. It is the recommendation of this clinician that pt discharge back to his group home. At this time the pt is not presenting as an acute risk to self or others due to the following factors: Denied suicidal ideation, able to contract for safety, and willing and wanting to  discharge back to his group home.     Plan:    Disposition  Recommended disposition: Group Home: [facility]  Continue with current outpatient psychiatrist     Reviewed case and recommendations with attending provider. Attending Name: Dr. Darling     Attending concurs with disposition: Yes      Patient and/or verified legal guardian concurs with disposition: Yes      Final disposition: Group home: [facility] .   Continues with current outpatient psychiatrist. Patient declined resources for individual therapy.       Assessment Details  Total duration spent on the patient case in minutes: .75 hrs     CPT code(s) utilized: 91156 - Psychotherapy for Crisis (Each additional 30 minutes) - 30 min        Lindsey Gotti, [facility], [facility]  Callback: [phone]      Aftercare Plan  If I am feeling unsafe or I am in a crisis, I will:   Contact my established care providers   Call the National Suicide Prevention Lifeline: 418  Go to the nearest emergency room   Call 416     Warning signs that I or other people might notice when a crisis is developing for me: increased substance use, paranoia, feeling unsafe    Things I am able to do on my own to cope or help me feel better:   Go to the gym  read     Things that I am able to do with others to cope or help me better:   Play basketball  Go to the gym  Talk with group home staff    Changes I can make to support my mental health and wellness:   -I will abstain from all mood altering chemicals not currently prescribed to me  -I will attend scheduled mental health therapy and psychiatric appointments and follow all  recommendations  -I will commit to 30 minutes of self care daily - this can be as simple as taking a shower, going for a walk, cooking a meal, read, writing, etc  -I will practice square breathing when I begin to feel anxious - in breath through the nose for the count of 4 and the first line on the square. Out breath  "through the mouth for the count of 4 for the second line of the square. Repeat to complete the square. Repeat the square as many times as needed.  - I will use distraction skills of: going for walks, watching TV, spending time outside, calling a friend or family member  -Use community resources, including hotline numbers, Critical access hospital crisis and support meetings  -Maintain a daily schedule/routine  -Practice deep breathing skills    People in my life that I can ask for help:   Group home staff    Your Critical access hospital has a mental health crisis team you can call 24/7: [facility] Mobile Crisis  [phone]       Additional resources and information:        Crisis Lines  Crisis Text Line  Text 964702  You will be connected with a trained live crisis counselor to provide support.    Por north, texto  SEAMUS a 968448 o texto a 442-AYUDAME en WhatsApp    [employer] (LGBTQ Youth Crisis Line)  5.965.693.2669  text START to 041-553      Community Resources  Fast Tracker  Linking people to mental health and substance use disorder resources  [employer]able[employer]     [place] Mental Health Warm Line  Peer to peer support  Monday thru Saturday, 12 pm to 10 pm  934.991.0621 or 5.399.937.9049  Text \"Support\" to 60113    National [place] on Mental Illness (KATRINA)  516.409.8541 or 1.888.KATRINA.HELPS      Mental Health Apps  My3  https://mySlycepp.org/    VirtualHopeBox  https://Modusly.org/apps/virtual-hope-box/      Additional Information  Today you were seen by a licensed mental health professional through Triage and Transition services, [facility] ([facility])  for a crisis assessment in the Emergency Department at [facility].  It is recommended that you follow up with your established providers (psychiatrist, mental health therapist, and/or primary care doctor - as relevant) as soon as possible. Coordinators from P will be calling you in the next 24-48 hours to ensure that you have the resources " you need.  You can also contact [facility] coordinators directly at [phone]. You may have been scheduled for or offered an appointment with a mental health provider. [facility] maintains an extensive network of licensed behavioral health providers to connect patients with the services they need.  We do not charge providers a fee to participate in our referral network.  We match patients with providers based on a patient's specific needs, insurance coverage, and location.  Our first effort will be to refer you to a provider within your care system, and will utilize providers outside your care system as needed.

## 2023-03-09 NOTE — TELEPHONE ENCOUNTER
R: [facility] ONLY    [facility] Access Inpatient Bed Call Log 3/9/2023 7:47 AM         Facilities that have not updated websites in the last 12 hours have been called.               Adults:              [facility] is posting 0 beds.  Negative covid.              [facility] is posting 0 beds. Negative covid.              [facility] is posting 0 beds. 72HH preferred. - 7:48am Per Luisa, open for low acuity both M and F.              [facility] is posting 0 beds.               [facility] is posting 0 beds. Negative covid.              [facility] is posting 0 beds.              [facility] is posting 0 beds. Negative covid. *Low acuity*    Pt remains on work list pending appropriate bed availability.    11:47 AM Intake received notification from Lindsey with [facility] that the patient is being discharged w/ outpatient supportive services. Intake updated the work-list. Intake no longer seeking active placement.

## 2023-03-09 NOTE — TELEPHONE ENCOUNTER
11:47 AM Intake received notification from Lindsey with [facility] that the patient is being discharged w/ outpatient supportive services. Intake updated the work-list. Intake no longer seeking active placement.

## 2023-03-09 NOTE — DISCHARGE INSTRUCTIONS
Aftercare Plan  If I am feeling unsafe or I am in a crisis, I will:   Contact my established care providers   Call the National Suicide Prevention Lifeline: 988  Go to the nearest emergency room   Call 911     Warning signs that I or other people might notice when a crisis is developing for me: increased substance use, paranoia, feeling unsafe    Things I am able to do on my own to cope or help me feel better:   Go to the gym  read     Things that I am able to do with others to cope or help me better:   Play basketball  Go to the gym  Talk with group home staff    Changes I can make to support my mental health and wellness:   -I will abstain from all mood altering chemicals not currently prescribed to me  -I will attend scheduled mental health therapy and psychiatric appointments and follow all  recommendations  -I will commit to 30 minutes of self care daily - this can be as simple as taking a shower, going for a walk, cooking a meal, read, writing, etc  -I will practice square breathing when I begin to feel anxious - in breath through the nose for the count of 4 and the first line on the square. Out breath through the mouth for the count of 4 for the second line of the square. Repeat to complete the square. Repeat the square as many times as needed.  - I will use distraction skills of: going for walks, watching TV, spending time outside, calling a friend or family member  -Use community resources, including hotline numbers, county crisis and support meetings  -Maintain a daily schedule/routine  -Practice deep breathing skills    People in my life that I can ask for help:   Group home staff    Your [facility] has a mental health crisis team you can call 24/7: [facility] Mobile Crisis  [phone]       Additional resources and information:       Crisis Lines  Crisis Text Line  Text 668347  You will be connected with a trained live crisis counselor to provide support.    Por karan kat a 759677 o  "ayano a 442-AYCARMELAME en Whatrolanda    The Kwame Project (LGBTQ Youth Crisis Line)  3.101.544.5842  text START to 732-131      Community Resources  Fast Tracker  Linking people to mental health and substance use disorder resources  Online [employer]er[employer]     [place] Mental Health Warm Line  Peer to peer support  Monday thru Saturday, 12 pm to 10 pm  759.177.1012 or 1.249.888.4881  Text \"Support\" to 57383    National [place] on Mental Illness (KATRINA)  591.299.2592 or 1.888.KATRINA.HELPS      Mental Health Apps  My3  https://apartum.org/    VirtualHopeBox  https://Hello Inc/apps/virtual-hope-box/      Additional Information  Today you were seen by a licensed mental health professional through Triage and Transition services, [facility] ([facility])  for a crisis assessment in the Emergency Department at [facility].  It is recommended that you follow up with your established providers (psychiatrist, mental health therapist, and/or primary care doctor - as relevant) as soon as possible. Coordinators from [facility] will be calling you in the next 24-48 hours to ensure that you have the resources you need.  You can also contact [facility] coordinators directly at [phone]. You may have been scheduled for or offered an appointment with a mental health provider. [facility] maintains an extensive network of licensed behavioral health providers to connect patients with the services they need.  We do not charge providers a fee to participate in our referral network.  We match patients with providers based on a patient's specific needs, insurance coverage, and location.  Our first effort will be to refer you to a provider within your care system, and will utilize providers outside your care system as needed.      "

## 2023-03-09 NOTE — PHARMACY-ADMISSION MEDICATION HISTORY
Admission Medication History Completed by Pharmacy    See [facility] Admission Navigator for allergy information, preferred outpatient pharmacy, prior to admission medications and immunization status.     Medication History Sources:     Group Home MAR and Nurse Ady from [facility]    Changes made to PTA medication list (reason):    Added: Per MAR  o All Medications    Deleted: None    Changed: None    Additional Information:    Per patient's group home nurse, the next haloperidol injection is to be given on March 23rd.     Per the MAR, the last administration date is not present.     Prior to Admission medications    Medication Sig Last Dose Taking? Auth Provider Long Term End Date   acyclovir (ZOVIRAX) 400 MG tablet Take 400 mg by mouth every 12 hours Unknown Yes Reported, Patient Yes    benztropine (COGENTIN) 1 MG tablet Take 1 mg by mouth 2 times daily Unknown Yes Reported, Patient Yes    buPROPion (WELLBUTRIN XL) 150 MG 24 hr tablet Take 150 mg by mouth every morning Unknown Yes Reported, Patient Yes    Cimetidine (HEARTBURN RELIEF PO) Take 1 tablet by mouth daily Unknown Yes Reported, Patient     divalproex sodium delayed-release (DEPAKOTE) 500 MG DR tablet Take 1 tablet by mouth every morning and take 2 tablets by mouth every night at bedtime Unknown Yes Reported, Patient Yes    haloperidol decanoate (HALDOL DECANOATE) 100 MG/ML injection Inject 100 mg into the muscle every 28 days Unknown Yes Reported, Patient Yes    hydrOXYzine (ATARAX) 50 MG tablet Take 50 mg by mouth At Bedtime Unknown Yes Reported, Patient     metFORMIN (GLUCOPHAGE) 500 MG tablet Take 500 mg by mouth 2 times daily (with meals) Unknown Yes Reported, Patient Yes    paliperidone (INVEGA SUSTENNA) 234 MG/1.5ML CARLOS Inject 234 mg into the muscle every 28 days 3/8/2023 at 11AM Yes Reported, Patient Yes    vitamin D2 (ERGOCALCIFEROL) 55468 units (1250 mcg) capsule Take 50,000 Units by mouth Every Monday and thursday Unknown Yes  Reported, Patient         Date completed: 03/08/23    Medication history completed by: Delmar Wolff

## 2023-03-09 NOTE — ED NOTES
"[facility] ED Mental Health Handoff Note:       Brief HPI:  This is a 22 year old male signed out to me by Dr. Grijalva.  See initial ED Provider note for full details of the presentation. Cooper Valentino Lenwood is a 22 year old male with substance abuse who presented with suicidal ideation and paranoia.  He has been abusing cough syrup.  He is currently awaiting mental health bed availability    Home meds reviewed and ordered/administered: Yes    Medically stable for inpatient mental health admission: Yes.    Evaluated by mental health: Yes. The recommendation is for inpatient mental health treatment. Bed search in process    Safety concerns: At the time I received sign out, there were no safety concerns.    Hold Status:  Active Orders   N/A            Exam:   Patient Vitals for the past 24 hrs:   BP Temp Temp src Pulse Resp SpO2 Height Weight   03/09/23 0810 132/77 -- -- 93 18 96 % -- --   03/09/23 0151 (!) 140/85 97.8  F (36.6  C) Oral 91 18 100 % -- --   03/08/23 1349 -- -- -- -- -- -- -- 112.5 kg (248 lb)   03/08/23 1344 133/76 97.7  F (36.5  C) Oral 76 18 97 % -- --   03/08/23 1330 -- -- -- -- -- -- 1.854 m (6' 1\") --           ED Course:  The patient was evaluated by extended-care  Lindsey on March 9 at approximately 9:45 AM.  Please see her separate documentation for details.  The patient now states he is ready to go back to his original group home.  He states he smoked marijuana yesterday and lied about his symptoms because he did not like his new group home.  He is no longer suicidal and is anxious to be discharged home.    I suspect that the patient's initial symptoms were likely secondary to substance induced psychosis.  He denies suicidal ideation.  He is forward thinking.  He will be discharged from the ED back to his group home. He has been given outpatient resources.  Medications   metFORMIN (GLUCOPHAGE) tablet 500 mg (500 mg Oral $Given 3/9/23 0808)   buPROPion (WELLBUTRIN XL) 24 hr " tablet 150 mg (150 mg Oral $Given 3/9/23 0808)   divalproex sodium delayed-release (DEPAKOTE) DR tablet 1,000 mg (1,000 mg Oral $Given 3/8/23 9109)   divalproex sodium delayed-release (DEPAKOTE) DR tablet 500 mg (500 mg Oral $Given 3/9/23 0808)   OLANZapine zydis (zyPREXA) ODT tab 10 mg (10 mg Oral $Given 3/8/23 8996)            There were no significant events during my shift.          Impression:    ICD-10-CM    1. Substance abuse (H)  F19.10       2. 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 Homeless  Z59.00           Plan:    1. Discharged.      RESULTS:   Results for orders placed or performed during the hospital encounter of 03/08/23 (from the past 24 hour(s))   [facility] ([facility]) Assessment Consult Order:     Status: None ()    Collection Time: 03/08/23  1:40 PM    Jenni Prado     3/8/2023  4:08 PM  Diagnostic Evaluation Consultation  Crisis Assessment    Patient was assessed: In Person  Patient location: [facility]  Was a release of information signed: No. Reason: specific   information available regarding group home not available.      Referral Data and Chief Complaint  Steven is a 22 year old, who uses he/him pronouns, and presents   to the ED via EMS. Patient is referred to the ED by self. Patient   is presenting to the ED for the following concerns: paranoia,   suicidal ideation.      Informed Consent and Assessment Methods     Patient identifies being his own guardian.  Writer met with   patient and explained the crisis assessment process, including   applicable information disclosures and limits to confidentiality,   assessed understanding of the process, and obtained consent to   proceed with the assessment. Patient was observed to be able to   participate in the assessment as evidenced by verbal agreement   and engagement in session.. Assessment methods included   conducting a formal interview with patient, review of  "medical   records, collaboration with medical staff, and obtaining relevant   collateral information from family and community providers when   available..     Over the course of this crisis assessment provided reassurance,   offered validation and engaged patient in problem solving and   disposition planning. Patient's response to interventions was   receptive.     Summary of Patient Situation     Patient reports he is here for marijuana and feeling really   depressed.  He states he called the police himself.  He states he   was at a new group home for about 10 minutes.  \"I have bad   friends.\"  Patient states he left and called the police.  He   reports there were rats, flies, spiders, and rat droppings at the   new place.  He states he was at another group home prior to this   new one.  He then explains it is a shelter, like an apartment.    Patient states he didn't want to stay at the first one due to   people there doing drugs.  Patient is unable to provide a name or   contact number for whatever type of housing that has been in.    Patient states he has been doing drugs and feeling depressed.  He   reports doing a marijuana edible today.  He denies other   substance use.  Patient gives inconsistent reports regarding   substance use.  He appears under the influence of substances.  He   reports using marijauna the past two months and never prior and   later reports he was kicked out of his parents home as a teenage   for using marijuana.  He reports his last use of marijuana was   earlier today.  He is negative for cannabis.  He is positive for   amphetamine.   He is reporting suicidal ideation with plan to   hang himself.  He states if he had a gun he would shoot himself.    Patient reports an attempt by hanging and gives different   accounts of when it occurred.  Responses included 1,2,3 weeks   ago.   Patient reports seeing cars and hearing voices.  He states   he thinks something and the voices respond back. "  He reports they   tell him to hurt himself, shoot someone, or threaten that he will   be brutalized or go to custodial      Brief Psychosocial History     Patient reports he was born and raised in [place].  He   reports his parents are .  He has an older brother and   younger sister.  He shares that his brother works 3 jobs and his   sister is in college.  He states he was kicked out of his parents   home at age 17-18 for using drugs.  Patient does not have contact   information for his parents.  His report of his siblings appears   to indicate more recent communication.  He does not have any   contact information for any group home or place he has been   living.       Significant Clinical History     Patient is not a reliable historian at this time.  Patient denies   any formal mental health history.  He reports feeling really   depressed.  Triage notes from nursing indicate report from EMS   that patient has hx of schizophrenia and is not taking   medications.  He was reportedly brought to a new group home today   and said he could not live there due to bugs.  He denies hx of   inpatient mental health or chemical dependency treatment.       Collateral Information    Patient does not have contact information for his parents or   housing situation.       Risk Assessment  [place] Suicide Severity Rating Scale Full Clinical Version:   3/8/23  Suicidal Ideation  1. Wish to be Dead (Lifetime): Yes  1. Wish to be Dead (Past 1 Month): Yes  2. Non-Specific Active Suicidal Thoughts (Lifetime): Yes  2. Non-Specific Active Suicidal Thoughts (Past 1 Month): Yes  3. Active Suicidal Ideation with any Methods (Not Plan) Without   Intent to Act (Lifetime): Yes  3. Active Suicidal Ideation with any Methods (Not Plan) Without   Intent to Act (Past 1 Month): Yes  4. Active Suicidal Ideation with Some Intent to Act, Without   Specific Plan (Lifetime): No  4. Active Suicidal Ideation with Some Intent to Act, Without    Specific Plan (Past 1 Month): No  5. Active Suicidal Ideation with Specific Plan and Intent   (Lifetime): Yes  5. Active Suicidal Ideation with Specific Plan and Intent (Past 1   Month): Yes  Intensity of Ideation  Most Severe Ideation Rating (Lifetime): 5  Most Severe Ideation Rating (Past 1 Month): 5  Frequency (Lifetime): Many times each day  Frequency (Past 1 Month): Many times each day  Duration (Lifetime): More than 8 hours/persistent or continuous  Duration (Past 1 Month): More than 8 hours/persistent or   continuous  Controllability (Lifetime): Unable to control thoughts  Controllability (Past 1 Month): Unable to control thoughts  Deterrents (Lifetime): Deterrents definitely did not stop you  Deterrents (Past 1 Month): Deterrents definitely did not stop you  Reasons for Ideation (Lifetime): Completely to end or stop the   pain (You couldn't go on living with the pain or how you were   feeling)  Reasons for Ideation (Past 1 Month): Completely to end or stop   the pain (You couldn't go on living with the pain or how you were   feeling)  Suicidal Behavior  Actual Attempt (Lifetime): Yes  Total Number of Actual Attempts (Lifetime): 1  Actual Attempt (Past 3 Months): Yes  Total Number of Actual Attempts (Past 3 Months): 1  Has subject engaged in non-suicidal self-injurious behavior?   (Lifetime): Yes  Has subject engaged in non-suicidal self-injurious behavior?   (Past 3 Months): No  Interrupted Attempts (Lifetime): No  Aborted or Self-Interrupted Attempt (Lifetime): Yes  Total Number of Aborted or Self-Interrupted Attempts (Lifetime):   1  Aborted or Self-Interrupted Attempt (Past 3 Months): Yes  Total Number of Aborted or Self-Interrupted Attempts (Past 3   Months): 1  Preparatory Acts or Behavior (Lifetime): Yes  Total Number of Preparatory Acts (Lifetime): 1  Preparatory Acts or Behavior (Past 3 Months): Yes  Total Number of Preparatory Acts (Past 3 Months): 1  C-SSRS Risk (Lifetime/Recent)  Calculated  C-SSRS Risk Score (Lifetime/Recent): High Risk    [place] Suicide Severity Rating Scale Since Last Contact: n/a    Validity of evaluation is impacted by presenting factors during   interview. Patient is under the influence of substances.  There   may be concerns for secondary gain, related to housing.   Comments regarding subjective versus objective responses to   [place] tool: none identified.  Environmental or Psychosocial Events: challenging interpersonal   relationships, impulsivity/recklessness,   unemployment/underemployment, unstable housing, homelessness and   neither working nor attending school  Chronic Risk Factors: history of suicide attempts (1 attempt by   hanging in the last 1-3 weeks.) and history of Non-Suicidal Self   Injury (NSSI)   Warning Signs: seeking access to means to hurt or kill self,   talking or writing about death, dying, or suicide, acting   reckless or engaging in risky activities and increasing substance   use or abuse  Protective Factors: help seeking  Interpretation of Risk Scoring, Risk Mitigation Interventions and   Safety Plan:  Patient is currently high risk.  He is reporting suicidal   ideation with plan to hang himself.  He states if he had a gun he   would shoot himself.  Patient reports an attempt by hanging and   gives different accounts of when it occurred.  Responses included   1,2,3 weeks ago.  Patient appears under the influence of   substances.  Plan for inpatient admission.  Continue to monitor   patient needs and potential disposition change as patient clears.    There may also be concern for secondary gain, related to   housing.         Does the patient have thoughts of harming others?  Patient denies   thoughts of harming others.  He does report voices telling him to   shoot others.     Is the patient engaging in sexually inappropriate behavior?  no        Current Substance Abuse     Is there recent substance abuse? Patient given inconsistent   reports regarding  substance use.  He appears under the influence   of substances.  He reports using marijauna the past two months   and never prior and later reports he was kicked out of his   parents home as a teenage for using marijuana.  He reports his   last use of marijuana was earlier today.  He is negative for   cannabis.  He is positive for amphetamine.       Was a urine drug screen or blood alcohol level obtained: Yes   positive for amphetamine       Mental Status Exam     Affect: incongruent  Appearance: Appropriate    Attention Span/Concentration: Inattentive  Eye Contact: Variable   Fund of Knowledge: Appropriate    Language /Speech Content: Fluent   Language /Speech Volume: Normal    Language /Speech Rate/Productions: Normal    Recent Memory: Poor   Remote Memory: Poor   Mood: Normal and Other: laughing at times.    Orientation to Person: Yes    Orientation to Place: Yes   Orientation to Time of Day: Yes    Orientation to Date: Yes    Situation (Do they understand why they are here?): Yes    Psychomotor Behavior: Normal    Thought Content: Suicidal   Thought Form: Intact      History of commitment: No     Medication    Psychotropic medications: No  Medication changes made in the last two weeks: No       Current Care Team    Primary Care Provider: No  Psychiatrist: No  Therapist: No  : No     CTSS or ARMHS: No  ACT Team: No  Other: No      Diagnosis    298.9 (F29)  Unspecified Schizophrenia Spectrum  Substance-Related & Addictive Disorders 292.89 Stimulant   Intoxication,  292.89 Amphetamine or other stimulant With   perceptual disturbances rule out    Clinical Summary and Substantiation of Recommendations    Patient is currently high risk.  He is reporting suicidal   ideation with plan to hang himself.  He states if he had a gun he   would shoot himself.  Patient reports an attempt by hanging and   gives different accounts of when it occurred.  Responses included   1,2,3 weeks ago.  Patient appears under the  influence of   substances.   Patient reports seeing cars and hearing voices.  He   states he thinks something and the voices respond back.  He   reports they tell him to hurt himself, shoot someone, or threaten   that he will be brutalized or go to intermediate .Plan for inpatient   admission.  Continue to monitor patient needs and potential   disposition change as patient clears.  There may also be concern   for secondary gain, related to housing.      Admission to Inpatient Level of Care is indicated due to:    1. Patient risk of severity of behavioral health disorder is   appropriate to proposed level of care as indicated by:    Imminent Risk of Harm: Very Recent suicide attempt or deliberate   act of serious harm to self WITHOUT relief of factors   precipitating the attempt or act, Command auditory hallucinations   or paranoid delusions contributing to risk of suicide or self   harm and Current plan for suicide or serious harm to self is   present  And/or:  Behavioral health disorder is present and appropriate for   inpatient care with both of the following:     Severe psychiatric, behavioral or other comorbid conditions are   appropriate for management at inpatient mental health as   indicated by at least one of the following:   o Hallucinations; delusions; positive symptoms and Depressive   symptoms, Comorbid substance use disorder and Impaired impulse   control, judgement, or insight    Severe dysfunction in daily living is present as indicated by   at least one of the following:   o Complete inability to maintain any appropriate aspect of   personal responsibility in any adult roles    2. Inpatient mental health services are necessary to meet patient   needs and at least one of the following:  Specific condition related to admission diagnosis is present and   judged likely to further improve at proposed level of care and   Specific condition related to admission diagnosis is present and   judged likely to  deteriorate in absence of treatment at proposed   level of care    3. Situation and expectations are appropriate for inpatient care,   as indicated by one of the following:   Voluntary treatment at lower level of care is not feasible    Disposition    Recommended disposition: Inpatient Mental Health       Reviewed case and recommendations with attending provider.   Attending Name: Dr Salvador       Attending concurs with disposition: Yes       Patient and/or validated legal guardian concurs with disposition:   Yes       Final disposition: Inpatient mental health .     Inpatient Details (if applicable):   Is patient admitted voluntarily:Yes      Patient aware of potential for transfer if there is not   appropriate placement? No       Patient is willing to travel outside of the [facility] for   placement? NA.  Revisit when patient clears    Behavioral Intake Notified? 3/8/23    Assessment Details    Patient interview started at: 2:28 and completed at: 3:00.     Total duration spent on the patient case in minutes: 1.25 hrs      CPT code(s) utilized: 60714 - Psychotherapy for Crisis - 60   (30-74*) min       Jenni Howard [facility] [place], [facility], Psychotherapist  DEC - Triage & Transition Services  Callback: [phone]                 Urine Drugs of Abuse Screen     Status: Abnormal    Collection Time: 03/08/23  1:43 PM    Narrative    The following orders were created for panel order Urine Drugs of Abuse Screen.  Procedure                               Abnormality         Status                     ---------                               -----------         ------                     Drug abuse screen 1 urin...[[id number]]  Abnormal            Final result                 Please view results for these tests on the individual orders.   Drug abuse screen 1 urine (ED)     Status: Abnormal    Collection Time: 03/08/23  1:43 PM   Result Value Ref Range    Amphetamines Urine Screen Positive (A) Screen Negative    Barbituates Urine Screen  Negative Screen Negative    Benzodiazepine Urine Screen Negative Screen Negative    Cannabinoids Urine Screen Negative Screen Negative    Cocaine Urine Screen Negative Screen Negative    Opiates Urine Screen Negative Screen Negative   Asymptomatic COVID-19 Virus (Coronavirus) by PCR Nasopharyngeal     Status: Normal    Collection Time: 03/08/23  1:54 PM    Specimen: Nasopharyngeal; Swab   Result Value Ref Range    SARS CoV2 PCR Negative Negative    Narrative    Testing was performed using the Xpert Xpress SARS-CoV-2 Assay on the Cepheid Gene-Xpert Instrument Systems. Additional information about this Emergency Use Authorization (EUA) assay can be found via the Lab Guide. This test should be ordered for the detection of SARS-CoV-2 in individuals who meet SARS-CoV-2 clinical and/or epidemiological criteria as well as from individuals without symptoms or other reasons to suspect COVID-19. Test performance for asymptomatic patients has only been established in anterior nasal swab specimens. This test is for in vitro diagnostic use under the FDA EUA for laboratories certified under CLIA to perform high complexity testing. This test has not been FDA cleared or approved. A negative result does not rule out the presence of PCR inhibitors in the specimen or target RNA concentration below the limit of detection for the assay. The possibility of a false negative should be considered if the patient's recent exposure or clinical presentation suggests COVID-19. This test was validated by the [facility]. This laboratory is certified under the Clinical Laboratory Improvement Amendments (CLIA) as qualified to perform high complexity laboratory testing.     CBC with Platelets & Differential     Status: Abnormal    Collection Time: 03/08/23  9:37 PM    Narrative    The following orders were created for panel order CBC with Platelets & Differential.  Procedure                                Abnormality         Status                     ---------                               -----------         ------                     CBC with platelets and d...[[id number]]  Abnormal            Final result                 Please view results for these tests on the individual orders.   Comprehensive metabolic panel     Status: Normal    Collection Time: 03/08/23  9:37 PM   Result Value Ref Range    Sodium 138 136 - 145 mmol/L    Potassium 4.3 3.4 - 5.3 mmol/L    Chloride 103 98 - 107 mmol/L    Carbon Dioxide (CO2) 25 22 - 29 mmol/L    Anion Gap 10 7 - 15 mmol/L    Urea Nitrogen 9.4 6.0 - 20.0 mg/dL    Creatinine 1.15 0.67 - 1.17 mg/dL    Calcium 9.3 8.6 - 10.0 mg/dL    Glucose 89 70 - 99 mg/dL    Alkaline Phosphatase 68 40 - 129 U/L    AST 14 10 - 50 U/L    ALT 14 10 - 50 U/L    Protein Total 6.9 6.4 - 8.3 g/dL    Albumin 3.8 3.5 - 5.2 g/dL    Bilirubin Total 0.2 <=1.2 mg/dL    GFR Estimate >90 >60 mL/min/1.73m2   CBC with platelets and differential     Status: Abnormal    Collection Time: 03/08/23  9:37 PM   Result Value Ref Range    WBC Count 7.1 4.0 - 11.0 10e3/uL    RBC Count 4.60 4.40 - 5.90 10e6/uL    Hemoglobin 13.0 (L) 13.3 - 17.7 g/dL    Hematocrit 38.2 (L) 40.0 - 53.0 %    MCV 83 78 - 100 fL    MCH 28.3 26.5 - 33.0 pg    MCHC 34.0 31.5 - 36.5 g/dL    RDW 16.8 (H) 10.0 - 15.0 %    Platelet Count 153 150 - 450 10e3/uL    % Neutrophils 54 %    % Lymphocytes 32 %    % Monocytes 12 %    % Eosinophils 2 %    % Basophils 0 %    % Immature Granulocytes 0 %    NRBCs per 100 WBC 0 <1 /100    Absolute Neutrophils 3.8 1.6 - 8.3 10e3/uL    Absolute Lymphocytes 2.3 0.8 - 5.3 10e3/uL    Absolute Monocytes 0.8 0.0 - 1.3 10e3/uL    Absolute Eosinophils 0.2 0.0 - 0.7 10e3/uL    Absolute Basophils 0.0 0.0 - 0.2 10e3/uL    Absolute Immature Granulocytes 0.0 <=0.4 10e3/uL    Absolute NRBCs 0.0 10e3/uL             MD Lisset Cevallos Alda L, MD  03/09/23 9829

## 2023-03-09 NOTE — TELEPHONE ENCOUNTER
R:  No appropriate beds within [place].  Bed Search update within [place] 11:00PM    [facility]: @ cap per website- Per Blossom, no beds available  [facility]: @ cap per website - Per Jane, at [facility]: @ cap per website.  Per Derek, LOW ACUITY only.  Pt not appropriate  [facility]: @ cap per website- Per Jane, at capacity  [facility]: @ cap per website  [facility]: @ cap per website - Per Jane, at capacity  [facility]: @ cap per website  [facility]:  @ cap per website        Pt remains on PPS work list awaiting appropriate placement.